# Patient Record
(demographics unavailable — no encounter records)

---

## 2018-02-24 NOTE — PHYS DOC
Past History


Past Medical History:  No Pertinent History


Past Surgical History:  No Surgical History





General Pediatric Assessment


Chief Complaint


Abscess


History of Present Illness





4-year-old female patient had painful area in her scalp for the last 4 days and 

had pus drainage this morning without fever and chills and history of MRSA.


Review of Systems





Constitutional: Denies fever or chills []


Eyes: Denies change in visual acuity, redness, or eye pain []


HENT: Denies nasal congestion or sore throat []


Respiratory: Denies cough or shortness of breath []


Cardiovascular: No additional information not addressed in HPI []


GI: Denies abdominal pain, nausea, vomiting, bloody stools or diarrhea []


: Denies dysuria or hematuria []


Musculoskeletal: Denies back pain or joint pain []


Integument: Denies rash or skin lesions []


Neurologic: Denies headache, focal weakness or sensory changes []


Endocrine: Denies polyuria or polydipsia []





All other systems were reviewed and found to be within normal limits, except as 

documented in this note.


Allergies





Allergies








Coded Allergies Type Severity Reaction Last Updated Verified


 


  No Known Drug Allergies    2/24/18 No








Physical Exam





Constitutional: Well developed, well nourished, no acute distress, non-toxic 

appearance, positive interaction, playful.


HENT: Normocephalic, atraumatic, bilateral external ears normal, oropharynx 

moist, no oral exudates, nose normal, 0.5 cm area of erythema without sign of 

abscess in scalp in right parietal


Eyes: PERLL, EOMI, conjunctiva normal, no discharge.


Neck: Normal range of motion, no tenderness, supple, no stridor.


Cardiovascular: Normal heart rate, normal rhythm, no murmurs, no rubs, no 

gallops.


Thorax and Lungs: Normal breath sounds, no respiratory distress, no wheezing, 

no chest tenderness, no retractions, no accessory muscle use.


Radiology/Procedures


[]


Current Patient Data





Vital Signs








  Date Time  Temp Pulse Resp B/P (MAP) Pulse Ox O2 Delivery O2 Flow Rate FiO2


 


2/24/18 14:40 98.4    98   








Vital Signs








  Date Time  Temp Pulse Resp B/P (MAP) Pulse Ox O2 Delivery O2 Flow Rate FiO2


 


2/24/18 14:40 98.4    98   








Vital Signs








  Date Time  Temp Pulse Resp B/P (MAP) Pulse Ox O2 Delivery O2 Flow Rate FiO2


 


2/24/18 14:40 98.4    98   








Course & Med Decision Making


Evaluation of patient in ER showed 40-year-old female patient brought in 

because of a lesion of her head with posterior drainage. Patient had a 

folliculitis with drain abscess without sign of abscess at this time.





Departure


Departure:


Impression:  


 Primary Impression:  


 Folliculitis


Disposition:  01 HOME, SELF-CARE (at 1522)


Condition:  STABLE


Referrals:  


NON,STAFF (PCP)


Patient Instructions:  RACQUEL Langley MD Feb 24, 2018 15:22